# Patient Record
Sex: MALE | Race: WHITE
[De-identification: names, ages, dates, MRNs, and addresses within clinical notes are randomized per-mention and may not be internally consistent; named-entity substitution may affect disease eponyms.]

---

## 2018-08-14 ENCOUNTER — HOSPITAL ENCOUNTER (EMERGENCY)
Dept: HOSPITAL 47 - EC | Age: 28
Discharge: HOME | End: 2018-08-14
Payer: COMMERCIAL

## 2018-08-14 VITALS — DIASTOLIC BLOOD PRESSURE: 81 MMHG | TEMPERATURE: 99.2 F | HEART RATE: 75 BPM | SYSTOLIC BLOOD PRESSURE: 128 MMHG

## 2018-08-14 VITALS — RESPIRATION RATE: 18 BRPM

## 2018-08-14 DIAGNOSIS — Z87.442: ICD-10-CM

## 2018-08-14 DIAGNOSIS — Z90.49: ICD-10-CM

## 2018-08-14 DIAGNOSIS — R11.0: ICD-10-CM

## 2018-08-14 DIAGNOSIS — R10.11: Primary | ICD-10-CM

## 2018-08-14 DIAGNOSIS — Z53.29: ICD-10-CM

## 2018-08-14 LAB
ALBUMIN SERPL-MCNC: 4.7 G/DL (ref 3.5–5)
ALP SERPL-CCNC: 56 U/L (ref 38–126)
ALT SERPL-CCNC: 32 U/L (ref 21–72)
AMYLASE SERPL-CCNC: 66 U/L (ref 30–110)
ANION GAP SERPL CALC-SCNC: 9 MMOL/L
AST SERPL-CCNC: 25 U/L (ref 17–59)
BASOPHILS # BLD AUTO: 0 K/UL (ref 0–0.2)
BASOPHILS NFR BLD AUTO: 0 %
BUN SERPL-SCNC: 12 MG/DL (ref 9–20)
CALCIUM SPEC-MCNC: 10 MG/DL (ref 8.4–10.2)
CHLORIDE SERPL-SCNC: 103 MMOL/L (ref 98–107)
CO2 SERPL-SCNC: 28 MMOL/L (ref 22–30)
EOSINOPHIL # BLD AUTO: 0.2 K/UL (ref 0–0.7)
EOSINOPHIL NFR BLD AUTO: 4 %
ERYTHROCYTE [DISTWIDTH] IN BLOOD BY AUTOMATED COUNT: 5.65 M/UL (ref 4.3–5.9)
ERYTHROCYTE [DISTWIDTH] IN BLOOD: 12 % (ref 11.5–15.5)
GLUCOSE SERPL-MCNC: 88 MG/DL (ref 74–99)
HCT VFR BLD AUTO: 48 % (ref 39–53)
HGB BLD-MCNC: 16.5 GM/DL (ref 13–17.5)
LIPASE SERPL-CCNC: 44 U/L (ref 23–300)
LYMPHOCYTES # SPEC AUTO: 1.6 K/UL (ref 1–4.8)
LYMPHOCYTES NFR SPEC AUTO: 32 %
MCH RBC QN AUTO: 29.3 PG (ref 25–35)
MCHC RBC AUTO-ENTMCNC: 34.4 G/DL (ref 31–37)
MCV RBC AUTO: 85 FL (ref 80–100)
MONOCYTES # BLD AUTO: 0.4 K/UL (ref 0–1)
MONOCYTES NFR BLD AUTO: 8 %
NEUTROPHILS # BLD AUTO: 2.7 K/UL (ref 1.3–7.7)
NEUTROPHILS NFR BLD AUTO: 53 %
PH UR: 8 [PH] (ref 5–8)
PLATELET # BLD AUTO: 212 K/UL (ref 150–450)
POTASSIUM SERPL-SCNC: 3.7 MMOL/L (ref 3.5–5.1)
PROT SERPL-MCNC: 7.3 G/DL (ref 6.3–8.2)
SODIUM SERPL-SCNC: 140 MMOL/L (ref 137–145)
SP GR UR: 1.01 (ref 1–1.03)
UROBILINOGEN UR QL STRIP: <2 MG/DL (ref ?–2)
WBC # BLD AUTO: 5.1 K/UL (ref 3.8–10.6)

## 2018-08-14 PROCEDURE — 85025 COMPLETE CBC W/AUTO DIFF WBC: CPT

## 2018-08-14 PROCEDURE — 36415 COLL VENOUS BLD VENIPUNCTURE: CPT

## 2018-08-14 PROCEDURE — 99284 EMERGENCY DEPT VISIT MOD MDM: CPT

## 2018-08-14 PROCEDURE — 76705 ECHO EXAM OF ABDOMEN: CPT

## 2018-08-14 PROCEDURE — 80053 COMPREHEN METABOLIC PANEL: CPT

## 2018-08-14 PROCEDURE — 83690 ASSAY OF LIPASE: CPT

## 2018-08-14 PROCEDURE — 74177 CT ABD & PELVIS W/CONTRAST: CPT

## 2018-08-14 PROCEDURE — 81003 URINALYSIS AUTO W/O SCOPE: CPT

## 2018-08-14 PROCEDURE — 74018 RADEX ABDOMEN 1 VIEW: CPT

## 2018-08-14 PROCEDURE — 82150 ASSAY OF AMYLASE: CPT

## 2018-08-14 NOTE — CT
EXAMINATION TYPE: CT abdomen pelvis w con

 

DATE OF EXAM: 8/14/2018

 

COMPARISON: CT abdomen and pelvis December 5, 2016. Limited abdominal ultrasound earlier today.

 

HISTORY: Flank pain right-sided

 

CT DLP: 1232 mGycm, Automated Exposure Control for Dose Reduction was Utilized.

 

CONTRAST: 

CT scan of the abdomen and pelvis is performed without oral but with IV Contrast, patient injected wi
th 100mL mL of Isovue 300.

 

FINDINGS:

 

LUNG BASES:  No significant abnormality is appreciated.

 

LIVER/GB: Correlating with ultrasound there is 2.1 cm hypodense lesion near gallbladder fossa right h
epatic lobe axial image 29. No significant change on delayed phased images axial image 28. Better vis
ualize may be slightly enlarged in size from 2016 CT.

 

PANCREAS:  No significant abnormality is seen.

 

SPLEEN:  No significant abnormality is seen.

 

ADRENALS:  No significant abnormality is seen.

 

KIDNEYS: There is symmetric cortical medullary uptake and excretion from both kidneys without evidenc
e of concerning renal mass or hydronephrosis bilaterally. No definite nephrolithiasis.

 

BOWEL: Appendix not well seen with certainty. No inflammatory change noted at base of cecum.

 

PROSTATE/SEMINAL VESICLES:  No gross abnormality seen.

 

LYMPH NODES:  No greater than 1cm abdominal or pelvic lymph nodes are appreciated.

 

OSSEOUS STRUCTURES:  No significant abnormality is seen.

 

OTHER:  No significant additional abnormality is seen.

 

IMPRESSION:  No significant acute finding is seen to account for patient's clinical symptoms. No sumit
l stones or hydronephrosis is evident bilaterally. Once again advise follow-up nonemergent liver prot
ocol contrast-enhanced CT or MRI to definitively characterize and classify right hepatic lobe lesion.

## 2018-08-14 NOTE — ED
Abdominal Pain HPI





- General


Source: patient


Mode of arrival: ambulatory


Limitations: no limitations





- History of Present Illness


MD Complaint: abdominal pain


-: hour(s)


Location: RUQ


Radiation: none


Migration to: no migration


Severity: moderate


Quality: dull


Consistency: colicky


Improves With: nothing


Worsens With: eating


Associated Symptoms: nausea





<HarrisonArcadio - Last Filed: 08/14/18 06:20>





<Ba Pretty - Last Filed: 08/14/18 09:23>





- General


Chief Complaint: Abdominal Pain


Stated Complaint: Flank Pain/pressure


Time Seen by Provider: 08/14/18 05:26





- Related Data


 Home Medications











 Medication  Instructions  Recorded  Confirmed


 


Ibuprofen [Advil] 400 mg PO Q6HR PRN 08/14/18 08/14/18











 Allergies











Allergy/AdvReac Type Severity Reaction Status Date / Time


 


No Known Allergies Allergy   Verified 08/14/18 07:21














Review of Systems


ROS Other: All systems not noted in ROS Statement are negative.


Constitutional: Denies: fever, chills


Respiratory: Denies: cough, dyspnea


Cardiovascular: Denies: chest pain, palpitations, edema


Gastrointestinal: Reports: abdominal pain, nausea.  Denies: vomiting, diarrhea, 

constipation, melena, hematochezia


Genitourinary: Denies: dysuria, frequency, hematuria, testicular pain


Musculoskeletal: Denies: back pain


Skin: Denies: rash


Neurological: Denies: headache





<HarrisonArcadio - Last Filed: 08/14/18 06:20>


ROS Other: All systems not noted in ROS Statement are negative.





<Ba Pretty - Last Filed: 08/14/18 09:23>


ROS Statement: 


Those systems with pertinent positive or pertinent negative responses have been 

documented in the HPI.








Past Medical History


Past Medical History: No Reported History


Additional Past Medical History / Comment(s): kidney stones


History of Any Multi-Drug Resistant Organisms: None Reported


Past Surgical History: Appendectomy


Additional Past Surgical History / Comment(s): nasal surgery


Past Psychological History: No Psychological Hx Reported


Smoking Status: Never smoker


Past Alcohol Use History: Occasional


Past Drug Use History: None Reported





<Arcadio Terry - Last Filed: 08/14/18 06:20>





General Exam


Limitations: no limitations


General appearance: alert, in no apparent distress


Head exam: Present: atraumatic, normocephalic


Eye exam: Present: normal appearance, PERRL, EOMI.  Absent: scleral icterus, 

conjunctival injection


Respiratory exam: Present: normal lung sounds bilaterally.  Absent: respiratory 

distress, wheezes, rales, rhonchi, stridor


Cardiovascular Exam: Present: regular rate, normal rhythm, normal heart sounds.

  Absent: systolic murmur, diastolic murmur, rubs, gallop


GI/Abdominal exam: Present: soft, normal bowel sounds.  Absent: distended, 

tenderness, guarding, rebound, rigid, mass, pulsatile mass, hernia


Extremities exam: Present: normal inspection, normal capillary refill.  Absent: 

pedal edema, calf tenderness


Back exam: Present: normal inspection.  Absent: CVA tenderness (R), CVA 

tenderness (L)


Neurological exam: Present: alert


Skin exam: Present: warm, dry, intact, normal color.  Absent: rash





<Arcadio eTrry - Last Filed: 08/14/18 06:20>





Course





<Arcadio Terry - Last Filed: 08/14/18 06:20>





<Ba Pretty - Last Filed: 08/14/18 09:23>


 Vital Signs











  08/14/18 08/14/18 08/14/18





  05:18 06:58 07:39


 


Temperature 98.8 F 98.1 F 100.5 F H


 


Pulse Rate 80 70 68


 


Respiratory 18 17 18





Rate   


 


Blood Pressure 138/88 125/76 131/78


 


O2 Sat by Pulse 98 100 99





Oximetry   














  08/14/18





  08:54


 


Temperature 99.2 F


 


Pulse Rate 75


 


Respiratory 18





Rate 


 


Blood Pressure 128/81


 


O2 Sat by Pulse 98





Oximetry 














- Reevaluation(s)


Reevaluation #1: 





08/14/18 08:05


Patient reevaluated by myself, Dr. Pretty.  Patient resting comfortably in bed.  

Patient does have mild to moderate tenderness to palpation mostly in the right 

upper quadrant.  Patient updated on results.  Patient has now developed a 

temperature of 100.5.  Patient is receptive to computed tomography scan.  

Patient refuses any medication.


08/14/18 09:21


Patient again reevaluated and resting comfortably at bedside.  Patient updated 

on CT results and need for further studies.  Patient also updated on need for 

follow-up.  Patient also refuses Tylenol.  Patient updated on need to return 

for worsening symptoms (Ba Pretty)





Medical Decision Making





- Lab Data


Result diagrams: 


 08/14/18 05:57








<Arcadio Terry - Last Filed: 08/14/18 06:20>





- Lab Data


Result diagrams: 


 08/14/18 05:57





 08/14/18 05:57





- Radiology Data


Radiology results: report reviewed (Ultrasound gallbladder shows no acute 

process.  Probable hemangioma.  Computed tomography scan of the abdomen pelvis 

shows no acute process.  Liver lesion.), image reviewed (KUB shows no acute 

process)





<Ba Pretty - Last Filed: 08/14/18 09:23>





- Lab Data


 Lab Results











  08/14/18 08/14/18 08/14/18 Range/Units





  05:57 05:57 06:37 


 


WBC   5.1   (3.8-10.6)  k/uL


 


RBC   5.65   (4.30-5.90)  m/uL


 


Hgb   16.5   (13.0-17.5)  gm/dL


 


Hct   48.0   (39.0-53.0)  %


 


MCV   85.0   (80.0-100.0)  fL


 


MCH   29.3   (25.0-35.0)  pg


 


MCHC   34.4   (31.0-37.0)  g/dL


 


RDW   12.0   (11.5-15.5)  %


 


Plt Count   212   (150-450)  k/uL


 


Neutrophils %   53   %


 


Lymphocytes %   32   %


 


Monocytes %   8   %


 


Eosinophils %   4   %


 


Basophils %   0   %


 


Neutrophils #   2.7   (1.3-7.7)  k/uL


 


Lymphocytes #   1.6   (1.0-4.8)  k/uL


 


Monocytes #   0.4   (0-1.0)  k/uL


 


Eosinophils #   0.2   (0-0.7)  k/uL


 


Basophils #   0.0   (0-0.2)  k/uL


 


Sodium  140    (137-145)  mmol/L


 


Potassium  3.7    (3.5-5.1)  mmol/L


 


Chloride  103    ()  mmol/L


 


Carbon Dioxide  28    (22-30)  mmol/L


 


Anion Gap  9    mmol/L


 


BUN  12    (9-20)  mg/dL


 


Creatinine  1.00    (0.66-1.25)  mg/dL


 


Est GFR (CKD-EPI)AfAm  >90    (>60 ml/min/1.73 sqM)  


 


Est GFR (CKD-EPI)NonAf  >90    (>60 ml/min/1.73 sqM)  


 


Glucose  88    (74-99)  mg/dL


 


Calcium  10.0    (8.4-10.2)  mg/dL


 


Total Bilirubin  0.6    (0.2-1.3)  mg/dL


 


AST  25    (17-59)  U/L


 


ALT  32    (21-72)  U/L


 


Alkaline Phosphatase  56    ()  U/L


 


Total Protein  7.3    (6.3-8.2)  g/dL


 


Albumin  4.7    (3.5-5.0)  g/dL


 


Amylase  66    ()  U/L


 


Lipase  44    ()  U/L


 


Urine Color    Light Yellow  


 


Urine Appearance    Clear  (Clear)  


 


Urine pH    8.0  (5.0-8.0)  


 


Ur Specific Gravity    1.009  (1.001-1.035)  


 


Urine Protein    Negative  (Negative)  


 


Urine Glucose (UA)    Negative  (Negative)  


 


Urine Ketones    Negative  (Negative)  


 


Urine Blood    Negative  (Negative)  


 


Urine Nitrite    Negative  (Negative)  


 


Urine Bilirubin    Negative  (Negative)  


 


Urine Urobilinogen    <2.0  (<2.0)  mg/dL


 


Ur Leukocyte Esterase    Negative  (Negative)  














Disposition





<Arcadio Terry - Last Filed: 08/14/18 06:20>


Is patient prescribed a controlled substance at d/c from ED?: No


Time of Disposition: 09:22





<Ba Pretty - Last Filed: 08/14/18 09:23>


Clinical Impression: 


 Abdominal pain





Disposition: HOME SELF-CARE


Condition: Stable


Instructions:  Abdominal Pain (ED)


Additional Instructions: 


Please follow-up with primary care physician in the next couple days for 

recheck.  Have primary care physician review ultrasound and CT results to 

further order testing regarding liver lesion.  Return for fevers, increased 

pain not tolerating fluids, worsening symptoms or other concerns.


Referrals: 


Carol Mart DO [REFERRING] - 1-2 days


NAVJOT Dan III, MD [STAFF PHYSICIAN] - 1-2 days

## 2018-08-14 NOTE — XR
EXAM:

  XR Abdomen, 2 Views

 

CLINICAL HISTORY:

  Abdominal pain

 

TECHNIQUE:

  Frontal view of the abdomen/pelvis with upright view of the abdomen.

 

COMPARISON:

  No relevant prior studies available.

 

FINDINGS:

  Intraperitoneal space:  No free air.

  Gastrointestinal tract:  Unremarkable.  No dilation.

  Bones/joints:  Unremarkable.

 

IMPRESSION:     

  Normal abdominal x-rays.

## 2018-08-14 NOTE — US
EXAMINATION TYPE: US abdomen limited

 

DATE OF EXAM: 8/14/2018

 

COMPARISON: CT 2016

 

CLINICAL HISTORY: Pain, attention RUQ. RUQ pain and nausea x 2 days

 

EXAM MEASUREMENTS:

 

Liver Length:  15.5 cm   

Gallbladder Wall:  0.3 cm   

CBD:  0.3 cm

Right Kidney:  10.6 x 5.6 x 5.2 cm

 

 

 

Pancreas:  visualized portions wnl, limited by overlying midline bowel gas

Liver:  2.4 x 2.5 x 2.0cm hyperechoic lesion right lobe adjacent to gallbladder, probable hemangioma 
  

Gallbladder:  wnl

**Evidence for sonographic Garber's sign:  no

CBD:  visualized portions wnl, limited by overlying bowel gas 

Right Kidney:  visualized portions wnl, limited by overlying bowel gas 

 

Visualized liver is heterogeneous in appearance. A 2.5 cm round hyperechoic lesion near gallbladder f
doris likely corresponds to hypodense lesions CT axial image 46 perhaps slightly larger in size from C
T.

 

IMPRESSION: 

1. No shadowing mobile gallstones are also evidence for acute cholecystitis.

2. A nonspecific 2.5 cm round hyperechoic lesion near gallbladder fossa would favor hemangioma. Nonem
ergent Liver protocol contrast-enhanced CT or MRI is advised to confirm.

## 2020-02-07 ENCOUNTER — HOSPITAL ENCOUNTER (OUTPATIENT)
Dept: HOSPITAL 47 - RADUSWWP | Age: 30
Discharge: HOME | End: 2020-02-07
Attending: INTERNAL MEDICINE
Payer: COMMERCIAL

## 2020-02-07 DIAGNOSIS — D18.03: Primary | ICD-10-CM

## 2020-02-07 PROCEDURE — 76700 US EXAM ABDOM COMPLETE: CPT

## 2020-02-07 NOTE — US
EXAMINATION TYPE: US abdomen complete

 

DATE OF EXAM: 2/7/2020

 

COMPARISON: 8/14/2018

 

CLINICAL HISTORY: D18.03 hemangioma of intra-abdominal structure. f/u of liver hemangioma, no complai
nts or symptoms

 

EXAM MEASUREMENTS:

 

Liver Length:  16.1 cm   

Gallbladder Wall:  0.2 cm   

CBD:  0.7 cm

Spleen:  9.9 cm   

Right Kidney:  11.1 x 4.5 x 5.6 cm 

Left Kidney:  10.4 x 4.9 x 5.4 cm   

 

 

 

Pancreas:  limited views appear wnl

Liver:  2.8 x 2.2 x 3.2 cm right lobe hemangioma seen, otherwise wnl .  Previous measurement 2.4 x 2.
5 x 2.0 cm

Gallbladder:  wnl

**Evidence for sonographic Garber's sign:  no

CBD:  wnl 

Spleen:  wnl   

Right Kidney:  wnl   

Left Kidney:  wnl   

Upper IVC:  wnl  

Abd Aorta:  wnl

 

IMPRESSION: 

1. Enlarged suspected hemangioma right lobe liver

## 2020-08-05 ENCOUNTER — HOSPITAL ENCOUNTER (OUTPATIENT)
Dept: HOSPITAL 47 - RADXRMAIN | Age: 30
Discharge: HOME | End: 2020-08-05
Attending: INTERNAL MEDICINE
Payer: COMMERCIAL

## 2020-08-05 DIAGNOSIS — M79.671: Primary | ICD-10-CM

## 2020-08-06 NOTE — XR
EXAMINATION TYPE: XR foot complete RT

 

DATE OF EXAM: 8/5/2020

 

CLINICAL HISTORY: Right foot in particular first toe pain since injury 5 weeks ago.

 

TECHNIQUE: Frontal, lateral, and oblique images of the right foot are obtained.

 

COMPARISON: None

 

FINDINGS:  There is no acute fracture/dislocation evident in the right foot with particular attention
 to first toe at area of clinical concern. Some flexion in the distal second through fifth toes is pr
esent. There is varus positioning of distal fourth and fifth toes  The joint spaces in the right foot
 appear within normal limits.  The overlying soft tissue appears unremarkable.

 

IMPRESSION:  There is no acute fracture or dislocation in the right foot.

## 2024-09-16 ENCOUNTER — HOSPITAL ENCOUNTER (OUTPATIENT)
Dept: HOSPITAL 47 - RADXRMAIN | Age: 34
Discharge: HOME | End: 2024-09-16
Attending: INTERNAL MEDICINE
Payer: COMMERCIAL

## 2024-09-17 NOTE — XR
EXAMINATION TYPE: XR wrist complete RT

 

DATE OF EXAM: 9/16/2024

 

COMPARISON: None

 

HISTORY: Left arm weakness clicking noise

 

TECHNIQUE: 4 view right wrist

 

FINDINGS: No acute fracture or dislocation evident. Joint spaces are preserved. Soft tissues are norm
al. Alignment is preserved.

 

Follow up exams can be performed 7-10 days from acute trauma for continued pain. If there is pain at 
the anatomic snuff box, nuclear medicine bone scan can be performed for additional evaluation.

 

IMPRESSION:

1.  No acute or chronic changes of the right wrist radiographically apparent.

 

[facility], Workstation: [id number], 9/17/2024 9:37 AM

## 2025-06-09 ENCOUNTER — HOSPITAL ENCOUNTER (OUTPATIENT)
Dept: HOSPITAL 47 - RADXRMAIN | Age: 35
Discharge: HOME | End: 2025-06-09
Attending: INTERNAL MEDICINE
Payer: COMMERCIAL
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
## 2025-06-09 DIAGNOSIS — S49.92XA: Primary | ICD-10-CM
